# Patient Record
Sex: MALE | Race: WHITE | NOT HISPANIC OR LATINO | Employment: FULL TIME | URBAN - METROPOLITAN AREA
[De-identification: names, ages, dates, MRNs, and addresses within clinical notes are randomized per-mention and may not be internally consistent; named-entity substitution may affect disease eponyms.]

---

## 2020-02-12 ENCOUNTER — HOSPITAL ENCOUNTER (EMERGENCY)
Facility: MEDICAL CENTER | Age: 55
End: 2020-02-12
Attending: EMERGENCY MEDICINE
Payer: COMMERCIAL

## 2020-02-12 VITALS
OXYGEN SATURATION: 96 % | TEMPERATURE: 96.8 F | BODY MASS INDEX: 26.46 KG/M2 | DIASTOLIC BLOOD PRESSURE: 90 MMHG | SYSTOLIC BLOOD PRESSURE: 128 MMHG | RESPIRATION RATE: 16 BRPM | WEIGHT: 174.6 LBS | HEART RATE: 82 BPM | HEIGHT: 68 IN

## 2020-02-12 DIAGNOSIS — K04.7 DENTAL ABSCESS: ICD-10-CM

## 2020-02-12 PROCEDURE — 700102 HCHG RX REV CODE 250 W/ 637 OVERRIDE(OP): Performed by: EMERGENCY MEDICINE

## 2020-02-12 PROCEDURE — A9270 NON-COVERED ITEM OR SERVICE: HCPCS | Performed by: EMERGENCY MEDICINE

## 2020-02-12 PROCEDURE — 96372 THER/PROPH/DIAG INJ SC/IM: CPT

## 2020-02-12 PROCEDURE — 99284 EMERGENCY DEPT VISIT MOD MDM: CPT

## 2020-02-12 PROCEDURE — 700111 HCHG RX REV CODE 636 W/ 250 OVERRIDE (IP): Performed by: EMERGENCY MEDICINE

## 2020-02-12 RX ORDER — AMOXICILLIN AND CLAVULANATE POTASSIUM 875; 125 MG/1; MG/1
1 TABLET, FILM COATED ORAL ONCE
Status: COMPLETED | OUTPATIENT
Start: 2020-02-12 | End: 2020-02-12

## 2020-02-12 RX ORDER — DICLOFENAC POTASSIUM 50 MG/1
1 POWDER, FOR SOLUTION ORAL EVERY 8 HOURS PRN
Qty: 21 EACH | Refills: 0 | Status: SHIPPED | OUTPATIENT
Start: 2020-02-12 | End: 2020-02-19

## 2020-02-12 RX ORDER — KETOROLAC TROMETHAMINE 30 MG/ML
30 INJECTION, SOLUTION INTRAMUSCULAR; INTRAVENOUS ONCE
Status: COMPLETED | OUTPATIENT
Start: 2020-02-12 | End: 2020-02-12

## 2020-02-12 RX ORDER — AMOXICILLIN AND CLAVULANATE POTASSIUM 875; 125 MG/1; MG/1
1 TABLET, FILM COATED ORAL 2 TIMES DAILY
Qty: 14 TAB | Refills: 0 | Status: SHIPPED | OUTPATIENT
Start: 2020-02-12 | End: 2020-02-19

## 2020-02-12 RX ORDER — ACETAMINOPHEN 500 MG
500-1000 TABLET ORAL EVERY 6 HOURS PRN
Qty: 30 TAB | Refills: 0 | Status: SHIPPED | OUTPATIENT
Start: 2020-02-12 | End: 2021-01-29

## 2020-02-12 RX ADMIN — KETOROLAC TROMETHAMINE 30 MG: 30 INJECTION, SOLUTION INTRAMUSCULAR at 18:25

## 2020-02-12 RX ADMIN — AMOXICILLIN AND CLAVULANATE POTASSIUM 1 TABLET: 875; 125 TABLET, FILM COATED ORAL at 18:25

## 2020-02-12 SDOH — HEALTH STABILITY: MENTAL HEALTH: HOW OFTEN DO YOU HAVE A DRINK CONTAINING ALCOHOL?: 2-4 TIMES A MONTH

## 2020-02-13 NOTE — ED TRIAGE NOTES
Virgilio Wu Burton  Chief Complaint   Patient presents with   • Facial Swelling     right cheek area     Pt ambulatory to triage with above complaint.  VSS, no acute distress noted.   Pt states that he believes he has a dental abscess.  Facial swelling noted to right cheek.   Pt returned to [facility], educated on triage process, and to inform staff of any changes or concerns.

## 2020-02-13 NOTE — ED PROVIDER NOTES
"ED Provider Note    Scribed for SUDEEP Mccormick* by Lamine Snell. 2/12/2020, 6:04 PM.    Primary care provider: None noted.  Means of arrival: walk in  History obtained from: patient  History limited by: none    CHIEF COMPLAINT  Chief Complaint   Patient presents with   • Facial Swelling     right cheek area       HPI  Virgilio Manrique is a 54 y.o. male who presents to the Emergency Department for right sided facial swelling onset 2-3 days prior to arrival. He reports associated upper jaw pain and notes 1x episode of blurry vision while trying to focus on computer screen today. He suspects he may have a dental abscess. He denies any fever, headache.    REVIEW OF SYSTEMS  Pertinent positives include right sided facial swelling, upper jaw pain, 1x episode of blurry vision. Pertinent negatives include no fever, headache.      PAST MEDICAL HISTORY   has a past medical history of Dental abscess.    SURGICAL HISTORY  patient denies any surgical history    SOCIAL HISTORY  Social History     Tobacco Use   • Smoking status: Current Every Day Smoker     Types: Cigarettes   • Smokeless tobacco: Never Used   Substance Use Topics   • Alcohol use: Yes     Frequency: 2-4 times a month   • Drug use: Yes     Types: Inhaled     Comment: [facility]      Social History     Substance and Sexual Activity   Drug Use Yes   • Types: Inhaled    Comment: [facility]       FAMILY HISTORY  History reviewed. No pertinent family history.    CURRENT MEDICATIONS  Home Medications     Reviewed by Heather Finn R.N. (Registered Nurse) on 02/12/20 at 0816  Med List Status: Complete   Medication Last Dose Status        Patient Fred Taking any Medications                       ALLERGIES  No Known Allergies    PHYSICAL EXAM  VITAL SIGNS: /89   Pulse 76   Temp 36 °C (96.8 °F) (Temporal)   Resp 17   Ht 1.727 m (5' 8\")   Wt 79.2 kg (174 lb 9.7 oz)   SpO2 98%   BMI 26.55 kg/m²     General: Alert, no acute distress  Skin: " "Warm, dry, normal for ethnicity  Head: Normocephalic, atraumatic. Swelling to left maxilla region  Neck: Trachea midline, no tenderness  Eye: PERRL, normal conjunctiva  ENMT: Oral mucosa moist, no pharyngeal erythema or exudate. Poor dentition. No palpable abscess in gingiva. Uvula midline, speech normal.  Cardiovascular: Regular rate and rhythm, No murmur, Normal peripheral perfusion  Respiratory: Lungs CTA, respirations are non-labored, breath sounds are equal  Musculoskeletal: No deformity  Neurological: Alert and oriented to person, place, time, and situation  Lymphatics: No lymphadenopathy  Psychiatric: Cooperative, appropriate mood & affect        DIAGNOSTIC STUDIES/PROCEDURES    COURSE & MEDICAL DECISION MAKING  Pertinent Labs & Imaging studies reviewed. (See chart for details)    6:04 PM - Patient seen and examined at bedside. Patient will be treated with Augmentin 875-125 mg 1 tablet, Toradol 30 mg. The differential diagnoses include but are not limited to: Dental abscess    Patient Vitals for the past 24 hrs:   BP Temp Temp src Pulse Resp SpO2 Height Weight   20 1833 128/90 -- -- 82 16 96 % -- --   20 1727 -- -- -- -- -- -- -- 79.2 kg (174 lb 9.7 oz)   20 1722 136/89 36 °C (96.8 °F) Temporal 76 17 98 % 1.727 m (5' 8\") --         Decision Makin-year-old male otherwise healthy presents for evaluation of facial pain and swelling.  He has very poor dentition, suspect likely dental abscess.  There is no marketed facial asymmetry, no fluctuance on the exam of the face for the gingiva, there is nothing that I can I&D at this point.  He is nontoxic, obviously uncomfortable but no indication for advanced imaging or oral surgery intervention at this point.  Antibiotics are clearly indicated.  I will also write for NSAID and acetaminophen for symptomatic relief.    The patient will return for new or worsening symptoms and is stable at the time of discharge.    Patient has had high blood " pressure while in the emergency department, felt likely secondary to medical condition. Counseled patient to monitor blood pressure at home and follow up with primary care physician.    DISPOSITION:  Patient will be discharged home in stable condition.    FOLLOW UP:  JOÃO Tim D.O.  [address] [place] [id number]  [phone]    Schedule an appointment as soon as possible for a visit in 3 days      Dentist of your choice    Schedule an appointment as soon as possible for a visit         OUTPATIENT MEDICATIONS:  New Prescriptions    ACETAMINOPHEN (TYLENOL) 500 MG TAB    Take 1-2 Tabs by mouth every 6 hours as needed for Mild Pain.    AMOXICILLIN-CLAVULANATE (AUGMENTIN) 875-125 MG TAB    Take 1 Tab by mouth 2 times a day for 7 days.    DICLOFENAC POTASSIUM 50 MG PACK    Take 1 Tab by mouth every 8 hours as needed (moderate pain) for up to 7 days.           FINAL IMPRESSION  1. Dental abscess          Lamine MUNGUIA (Scribe), am scribing for, and in the presence of, ARMEN Mccormick.    Electronically signed by: Lamine Snell (Scribe), 2/12/2020    Priscilla MUNGUIA M* personally performed the services described in this documentation, as scribed by Lamine Snell in my presence, and it is both accurate and complete. E    The note accurately reflects work and decisions made by me.  Priscilla Weller M.D.  2/12/2020  6:48 PM

## 2021-01-08 ENCOUNTER — TELEPHONE (OUTPATIENT)
Dept: SCHEDULING | Facility: IMAGING CENTER | Age: 56
End: 2021-01-08

## 2021-01-29 ENCOUNTER — OFFICE VISIT (OUTPATIENT)
Dept: MEDICAL GROUP | Facility: PHYSICIAN GROUP | Age: 56
End: 2021-01-29
Payer: COMMERCIAL

## 2021-01-29 ENCOUNTER — TELEPHONE (OUTPATIENT)
Dept: MEDICAL GROUP | Facility: PHYSICIAN GROUP | Age: 56
End: 2021-01-29

## 2021-01-29 ENCOUNTER — APPOINTMENT (OUTPATIENT)
Dept: RADIOLOGY | Facility: IMAGING CENTER | Age: 56
End: 2021-01-29
Attending: NURSE PRACTITIONER
Payer: COMMERCIAL

## 2021-01-29 VITALS
SYSTOLIC BLOOD PRESSURE: 136 MMHG | DIASTOLIC BLOOD PRESSURE: 70 MMHG | HEIGHT: 68 IN | WEIGHT: 189 LBS | HEART RATE: 74 BPM | BODY MASS INDEX: 28.64 KG/M2 | OXYGEN SATURATION: 94 % | RESPIRATION RATE: 16 BRPM | TEMPERATURE: 97.8 F

## 2021-01-29 DIAGNOSIS — G47.09 OTHER INSOMNIA: ICD-10-CM

## 2021-01-29 DIAGNOSIS — Z12.11 COLON CANCER SCREENING: ICD-10-CM

## 2021-01-29 DIAGNOSIS — R22.2 SUBCUTANEOUS NODULE OF ABDOMINAL WALL: ICD-10-CM

## 2021-01-29 DIAGNOSIS — Z78.9 ALCOHOL USE: ICD-10-CM

## 2021-01-29 DIAGNOSIS — Z72.0 TOBACCO USE: ICD-10-CM

## 2021-01-29 DIAGNOSIS — H61.21 IMPACTED CERUMEN OF RIGHT EAR: ICD-10-CM

## 2021-01-29 DIAGNOSIS — R07.81 RIB PAIN ON RIGHT SIDE: ICD-10-CM

## 2021-01-29 DIAGNOSIS — R07.81 RIB PAIN ON RIGHT SIDE: Primary | ICD-10-CM

## 2021-01-29 DIAGNOSIS — Z11.59 NEED FOR HEPATITIS C SCREENING TEST: ICD-10-CM

## 2021-01-29 DIAGNOSIS — R63.5 WEIGHT GAIN: ICD-10-CM

## 2021-01-29 DIAGNOSIS — Z00.00 HEALTHCARE MAINTENANCE: ICD-10-CM

## 2021-01-29 PROCEDURE — 99204 OFFICE O/P NEW MOD 45 MIN: CPT | Performed by: NURSE PRACTITIONER

## 2021-01-29 PROCEDURE — 71046 X-RAY EXAM CHEST 2 VIEWS: CPT | Mod: TC | Performed by: NURSE PRACTITIONER

## 2021-01-29 SDOH — HEALTH STABILITY: MENTAL HEALTH: HOW OFTEN DO YOU HAVE A DRINK CONTAINING ALCOHOL?: 2-3 TIMES A WEEK

## 2021-01-29 SDOH — HEALTH STABILITY: MENTAL HEALTH: HOW MANY STANDARD DRINKS CONTAINING ALCOHOL DO YOU HAVE ON A TYPICAL DAY?: 1 OR 2

## 2021-01-29 ASSESSMENT — PATIENT HEALTH QUESTIONNAIRE - PHQ9: CLINICAL INTERPRETATION OF PHQ2 SCORE: 0

## 2021-01-29 NOTE — ASSESSMENT & PLAN NOTE
This is a chronic condition. He is smoking.  Years used:  30  Packs/day:  1.0 PPD  Previously quit:  1996 - for about  6 months  Method used:  Nicotine patch

## 2021-01-29 NOTE — TELEPHONE ENCOUNTER
----- Message from YEIMI Mejia sent at 1/29/2021  1:02 PM PST -----  Hi,     Please Greg and let him know that his chest x-ray came back normal.  There is no cardiac or respiratory disease seen.  There are no bony abnormalities seen.      Thank you!  Apolonia

## 2021-01-29 NOTE — PROGRESS NOTES
Subjective  Chief Complaint  Establish to manage his chronic conditions    History of Present Illness  Virgilio Manrique is a 55 y.o. male. This patient is here today to establish care.  He has not seen a primary care provider for years.    Tobacco use  This is a chronic condition. He is smoking.  Years used:  30  Packs/day:  1.0 PPD  Previously quit:  1996 - for about  6 months  Method used:  Nicotine patch    Rib pain on right side  Acute pain   This is a new problem.   Patient is complaining of pain x 2 month(s) located in his right rib pain.  Pain is intermittent, described as sharp and a 5/10 on the pain scale.   Treatments tried include: nothing  He reports the pain is below right breast, around his anterior rib cage.  He reports when he tries to turn over to his left hand side it causes pain. He denies falling or trauma to the area.     Weight gain  He reports that he has gained 20 pounds over the last year.    Alcohol use  This is a chronic condition.  Max EtOH use:  2 - 1.5oz liquor bottles  Current EtOH use:  Yes  Drink(s) of choice:  Hard liquor  Symptoms of withdrawal: none  CAGE:        Have you ever felt you should Cut down on your drinking?  No      Have people Annoyed you by criticizing your drinking?  No      Have you ever felt bad or Guilty about your drinking?  No      Have you ever taken a drink first thing in the morning (Eye-opener) to steady your nerves or get rid of a hangover?  No    Other insomnia  This is a chronic condition.  He reports he is waking up every 2 hours throughout the night.  He does report he takes Tylenol PM and/or Advil PM nightly to help him sleep.  He has tried melatonin in the past with some success.  He most recently is using melatonin time release.    Past Medical History    Allergies: Patient has no known allergies.  Past Medical History:   Diagnosis Date   • Dental abscess      History reviewed. No pertinent surgical history.  No current Epic-ordered outpatient  "medications on file.     No current [facility]-ordered facility-administered medications on file.      Family History:    Family History   Problem Relation Age of Onset   • Cancer Mother         lung   • Cancer Father         lung   • Hyperlipidemia Brother    • Heart Disease Paternal Aunt    • Diabetes Neg Hx    • Hypertension Neg Hx    • Stroke Neg Hx       Personal/Social History:    Social History     Tobacco Use   • Smoking status: Current Every Day Smoker     Packs/day: 1.00     Years: 35.00     Pack years: 35.00     Types: Cigarettes   • Smokeless tobacco: Never Used   Substance Use Topics   • Alcohol use: Yes     Alcohol/week: 0.6 oz     Types: 1 Standard drinks or equivalent per week     Frequency: 2-3 times a week     Drinks per session: 1 or 2   • Drug use: Yes     Frequency: 3.0 times per week     Types: Inhaled     Comment: occasionally, ryne     Social History     Social History Narrative   • Not on file      Review of Systems:     General: Negative for fever/chills.    Respiratory:  Positive for cough related to smoking.  Negative for dyspnea.     Cardiovascular:  Negative for chest pain.   Gastrointestinal:  Negative for nausea/vomiting.    Musculoskeletal:  Positive for right rib pain.     Objective  Physical Exam:   /70 (BP Location: Right arm, Patient Position: Sitting, BP Cuff Size: Adult)   Pulse 74   Temp 36.6 °C (97.8 °F) (Temporal)   Resp 16   Ht 1.727 m (5' 8\")   Wt 85.7 kg (189 lb)   SpO2 94%  Body mass index is 28.74 kg/m².  General:  Alert and oriented.  Well appearing.  NAD.  Head:  Normocephalic.   Eyes:  Eyes conjunctiva clear lids without ptosis, pupils equal and reactive to light accommodation.    ENT: Ears normal shape and contour.  Left canal is clear, tympanic membrane is benign.  Right canal is obstructed with cerumen - unable to visualize tympanic membrane.  Oropharynx is without erythema, edema or exudates.   Neck: Supple without JVD. No lymphadenopathy.  Pulmonary: "  Normal effort.  Diminished to ausculation without rales, ronchi, or wheezing.  Cardiovascular:  Regular rate and rhythm without murmur, rubs or gallop.  Radial pulses are intact and equal bilaterally.  Gastrointestinal: Abdomen soft, nontender, nondistended. Normal bowel sounds. Liver and spleen are not palpable.  Bilateral soft tissue masses, nontender palpated to bilateral lower rib cage.  Hard, nontender nodule palpated under sternum bone.  Musculoskeletal:  No extremity cyanosis, clubbing, or edema.    Skin:  Warm and dry.  No obvious lesions.  Lymph: No cervical or supraclavicular lymph nodes are palpable.  Neurologic: Grossly intact.  DTRs 2+/3 bilaterally.  Sensation intact.   Psych: Normal mood and affect. Alert and oriented x3. Judgment and insight is normal.      Assessment/Plan   1. Rib pain on right side  This is a new condition.  He reports that over the last two months he has had right rib pain that occurs when he sneezes or coughs.  He denies any trauma to the area.  He reports that the pain is sharp when it does occur.  At this time we will obtain a chest x-ray to visualize the area.  We will also obtain labs for further interventions.  He does have a significant history for tobacco use for more than 20 years.  - DX-CHEST-2 VIEWS; Future  - CBC WITH DIFFERENTIAL; Future  - Comp Metabolic Panel; Future    2. Tobacco use  This is a chronic condition, not controlled.  He does smoke 1 pack a day.  He has been smoking since he was 20 years old.  He is aware that he needs to stop smoking.  He has tried to stop smoking in the past.  We did discuss options to help him quit smoking, including nicotine and other medication management assistance.  He is open to starting these and we will discuss this further at next visit per patient request.  - CBC WITH DIFFERENTIAL; Future  - Comp Metabolic Panel; Future  - Lipid Profile; Future  - TSH WITH REFLEX TO FT4; Future  - VITAMIN D,25 HYDROXY; Future  - VITAMIN  B12; Future    3. Other insomnia  This is a chronic condition, not controlled.  He takes Tylenol PM or Advil PM nightly to help with his sleep.  We did review potential for bleeding as well that is liver toxicity with use of both medications chronically.  He has tried melatonin in the past but intermittently.  I did discuss with him that he can take melatonin nightly for the next month, and this will help to reset his circadian clock.  He is open to doing this, and he will try a higher dose than the previous doses he has taken in the past.  We will follow this up at next visit.  - TSH WITH REFLEX TO FT4; Future  - VITAMIN D,25 HYDROXY; Future    4. Weight gain  This is a chronic condition, not controlled.  He reports he has gained 20 pounds over the last year.  He reports that his family believes is due to his alcohol consumption.  We will obtain some labs for further evaluation and to determine further interventions.  - CBC WITH DIFFERENTIAL; Future  - Comp Metabolic Panel; Future  - Lipid Profile; Future  - TSH WITH REFLEX TO FT4; Future    5. Alcohol use  This is a chronic condition, not controlled.  He does drink 2-1.5 ounce liquor bottles 2-3 times a week.  Cage score = 0.  We did discuss the importance of decreasing his alcohol use, as this may be contributing to his weight gain.  - CBC WITH DIFFERENTIAL; Future  - Comp Metabolic Panel; Future  - Lipid Profile; Future  - TSH WITH REFLEX TO FT4; Future  - VITAMIN D,25 HYDROXY; Future  - VITAMIN B12; Future    6. Subcutaneous nodule of abdominal wall  This is a new condition.  He reports there is a nodule to his mid abdomen, under his sternum bone.  He also reports that there were 2 nodules located bilaterally to his rib cage area.  Upon assessment, there is a hard, nontender nodule to his mid abdomen, and under his sternum bone as well as to soft, nontender nodules on his lateral rib cages.  At this point will obtain ultrasound to evaluate these nodules.  -  US-ABDOMEN COMPLETE SURVEY; Future  - CBC WITH DIFFERENTIAL; Future  - Comp Metabolic Panel; Future    7. Healthcare maintenance  He has not had a primary care provider for quite some time.  We discussed that we will complete health care maintenance and order preventative screenings.    - CBC WITH DIFFERENTIAL; Future  - Comp Metabolic Panel; Future  - Lipid Profile; Future  - TSH WITH REFLEX TO FT4; Future  - VITAMIN D,25 HYDROXY; Future  - VITAMIN B12; Future    8. Colon cancer screening  - REFERRAL TO GI FOR COLONOSCOPY    9. Need for hepatitis C screening test  - HCV Scrn ( [id number] [employer]); Future    10. Impacted cerumen of right ear  Right ear canal impacted with cerumen prior to ear irrigation.  After ear irrigation, right ear canal is clear and tympanic membrane has   - Ear Irrigation (MA Only); Future    Health Maintenance: Completed  - He is refusing vaccinations at this time despite education regarding importance of vaccines.     Return in about 4 weeks (around 2021), or if symptoms worsen or fail to improve, for follow up labs, imaging.    I have placed the above orders and discussed them with an approved delegating provider.  The MA is performing the below orders under the direction of Dr. Bran DO.     I spent a total of 50 minutes with record review, exam, and communication with the patient, communication with other providers, and documentation of this encounter.    Please note that this dictation was created using voice recognition software. I have made every reasonable attempt to correct obvious errors, but I expect that there are errors of grammar and possibly content that I did not discover before finalizing the note.    MARIANNA Mejia  [facility]

## 2021-01-29 NOTE — ASSESSMENT & PLAN NOTE
This is a chronic condition.  Max EtOH use:  2 - 1.5oz liquor bottles  Current EtOH use:  Yes  Drink(s) of choice:  Hard liquor  Symptoms of withdrawal: none  CAGE:        Have you ever felt you should Cut down on your drinking?  No      Have people Annoyed you by criticizing your drinking?  No      Have you ever felt bad or Guilty about your drinking?  No      Have you ever taken a drink first thing in the morning (Eye-opener) to steady your nerves or get rid of a hangover?  No

## 2021-01-29 NOTE — ASSESSMENT & PLAN NOTE
Acute pain   This is a new problem.   Patient is complaining of pain x 2 month(s) located in his right rib pain.  Pain is intermittent, described as sharp and a 5/10 on the pain scale.   Treatments tried include: nothing  He reports the pain is below right breast, around his anterior rib cage.  He reports when he tries to turn over to his left hand side it causes pain. He denies falling or trauma to the area.

## 2021-01-29 NOTE — ASSESSMENT & PLAN NOTE
This is a chronic condition.  He reports he is waking up every 2 hours throughout the night.  He does report he takes Tylenol PM and/or Advil PM nightly to help him sleep.  He has tried melatonin in the past with some success.  He most recently is using melatonin time release.

## 2021-02-17 ENCOUNTER — HOSPITAL ENCOUNTER (OUTPATIENT)
Dept: LAB | Facility: MEDICAL CENTER | Age: 56
End: 2021-02-17
Attending: NURSE PRACTITIONER
Payer: COMMERCIAL

## 2021-02-17 ENCOUNTER — TELEPHONE (OUTPATIENT)
Dept: MEDICAL GROUP | Facility: PHYSICIAN GROUP | Age: 56
End: 2021-02-17

## 2021-02-17 ENCOUNTER — HOSPITAL ENCOUNTER (OUTPATIENT)
Dept: RADIOLOGY | Facility: MEDICAL CENTER | Age: 56
End: 2021-02-17
Attending: NURSE PRACTITIONER
Payer: COMMERCIAL

## 2021-02-17 DIAGNOSIS — R22.2 SUBCUTANEOUS NODULE OF ABDOMINAL WALL: ICD-10-CM

## 2021-02-17 DIAGNOSIS — Z11.59 NEED FOR HEPATITIS C SCREENING TEST: ICD-10-CM

## 2021-02-17 DIAGNOSIS — G47.09 OTHER INSOMNIA: ICD-10-CM

## 2021-02-17 DIAGNOSIS — R63.5 WEIGHT GAIN: ICD-10-CM

## 2021-02-17 DIAGNOSIS — Z72.0 TOBACCO USE: ICD-10-CM

## 2021-02-17 DIAGNOSIS — Z00.00 HEALTHCARE MAINTENANCE: ICD-10-CM

## 2021-02-17 DIAGNOSIS — Z78.9 ALCOHOL USE: ICD-10-CM

## 2021-02-17 DIAGNOSIS — R07.81 RIB PAIN ON RIGHT SIDE: ICD-10-CM

## 2021-02-17 LAB
25(OH)D3 SERPL-MCNC: 40 NG/ML (ref 30–100)
ALBUMIN SERPL BCP-MCNC: 4.3 G/DL (ref 3.2–4.9)
ALBUMIN/GLOB SERPL: 1.6 G/DL
ALP SERPL-CCNC: 64 U/L (ref 30–99)
ALT SERPL-CCNC: 12 U/L (ref 2–50)
ANION GAP SERPL CALC-SCNC: 10 MMOL/L (ref 7–16)
AST SERPL-CCNC: 14 U/L (ref 12–45)
BASOPHILS # BLD AUTO: 1 % (ref 0–1.8)
BASOPHILS # BLD: 0.05 K/UL (ref 0–0.12)
BILIRUB SERPL-MCNC: 0.5 MG/DL (ref 0.1–1.5)
BUN SERPL-MCNC: 17 MG/DL (ref 8–22)
CALCIUM SERPL-MCNC: 9.3 MG/DL (ref 8.5–10.5)
CHLORIDE SERPL-SCNC: 108 MMOL/L (ref 96–112)
CHOLEST SERPL-MCNC: 235 MG/DL (ref 100–199)
CO2 SERPL-SCNC: 25 MMOL/L (ref 20–33)
CREAT SERPL-MCNC: 0.99 MG/DL (ref 0.5–1.4)
EOSINOPHIL # BLD AUTO: 0.09 K/UL (ref 0–0.51)
EOSINOPHIL NFR BLD: 1.9 % (ref 0–6.9)
ERYTHROCYTE [DISTWIDTH] IN BLOOD BY AUTOMATED COUNT: 41.2 FL (ref 35.9–50)
GLOBULIN SER CALC-MCNC: 2.7 G/DL (ref 1.9–3.5)
GLUCOSE SERPL-MCNC: 98 MG/DL (ref 65–99)
HCT VFR BLD AUTO: 52.7 % (ref 42–52)
HCV AB SER QL: NORMAL
HDLC SERPL-MCNC: 59 MG/DL
HGB BLD-MCNC: 17 G/DL (ref 14–18)
IMM GRANULOCYTES # BLD AUTO: 0.04 K/UL (ref 0–0.11)
IMM GRANULOCYTES NFR BLD AUTO: 0.8 % (ref 0–0.9)
LDLC SERPL CALC-MCNC: 153 MG/DL
LYMPHOCYTES # BLD AUTO: 1.38 K/UL (ref 1–4.8)
LYMPHOCYTES NFR BLD: 28.4 % (ref 22–41)
MCH RBC QN AUTO: 26.1 PG (ref 27–33)
MCHC RBC AUTO-ENTMCNC: 32.3 G/DL (ref 33.7–35.3)
MCV RBC AUTO: 81 FL (ref 81.4–97.8)
MONOCYTES # BLD AUTO: 0.38 K/UL (ref 0–0.85)
MONOCYTES NFR BLD AUTO: 7.8 % (ref 0–13.4)
NEUTROPHILS # BLD AUTO: 2.92 K/UL (ref 1.82–7.42)
NEUTROPHILS NFR BLD: 60.1 % (ref 44–72)
NRBC # BLD AUTO: 0 K/UL
NRBC BLD-RTO: 0 /100 WBC
PLATELET # BLD AUTO: 223 K/UL (ref 164–446)
PMV BLD AUTO: 9.9 FL (ref 9–12.9)
POTASSIUM SERPL-SCNC: 4.1 MMOL/L (ref 3.6–5.5)
PROT SERPL-MCNC: 7 G/DL (ref 6–8.2)
RBC # BLD AUTO: 6.51 M/UL (ref 4.7–6.1)
SODIUM SERPL-SCNC: 143 MMOL/L (ref 135–145)
TRIGL SERPL-MCNC: 116 MG/DL (ref 0–149)
TSH SERPL DL<=0.005 MIU/L-ACNC: 1.58 UIU/ML (ref 0.38–5.33)
VIT B12 SERPL-MCNC: 541 PG/ML (ref 211–911)
WBC # BLD AUTO: 4.9 K/UL (ref 4.8–10.8)

## 2021-02-17 PROCEDURE — 80061 LIPID PANEL: CPT

## 2021-02-17 PROCEDURE — 80053 COMPREHEN METABOLIC PANEL: CPT

## 2021-02-17 PROCEDURE — 84443 ASSAY THYROID STIM HORMONE: CPT

## 2021-02-17 PROCEDURE — 36415 COLL VENOUS BLD VENIPUNCTURE: CPT

## 2021-02-17 PROCEDURE — G0472 HEP C SCREEN HIGH RISK/OTHER: HCPCS

## 2021-02-17 PROCEDURE — 82306 VITAMIN D 25 HYDROXY: CPT

## 2021-02-17 PROCEDURE — 85025 COMPLETE CBC W/AUTO DIFF WBC: CPT

## 2021-02-17 PROCEDURE — 76705 ECHO EXAM OF ABDOMEN: CPT

## 2021-02-17 PROCEDURE — 82607 VITAMIN B-12: CPT

## 2021-02-17 NOTE — TELEPHONE ENCOUNTER
----- Message from YEIMI Mejia sent at 2/17/2021 11:23 AM PST -----  Hi,     Please call Greg to let him know that the bumps on his abdomen appear to be lipomas, which are consisted of fat.  Lipomas are benign.  These are usually surgically removed.  We can discuss this further at his next visit.    Thank you!  Apolonia

## 2021-02-18 ENCOUNTER — TELEPHONE (OUTPATIENT)
Dept: MEDICAL GROUP | Facility: PHYSICIAN GROUP | Age: 56
End: 2021-02-18

## 2021-02-18 NOTE — TELEPHONE ENCOUNTER
----- Message from YEIMI Mejia sent at 2/17/2021  3:46 PM PST -----  Hi,     Please call Greg and let him know that all his labs looked great except:   - Total cholesterol and LDL (bad cholesterol) are elevated.  These numbers can change with diet and exercise.    - Red blood count and hematocrit count are elevated.  This can be due to dehydration as well as tobacco use.  Otherwise, he is not anemic.     We can review his labs further at his next visit.     Thank you!Apolonia   Let her know that she can discontinue the Cipro, and we will look at the CT of the abdomen to see exactly what is causing the abdominal pain.

## 2021-02-19 NOTE — TELEPHONE ENCOUNTER
Phone Number Called: [phone] (home)       Call outcome: Spoke to patient regarding message below.    Message: pt advised.

## 2021-03-16 ENCOUNTER — OFFICE VISIT (OUTPATIENT)
Dept: URGENT CARE | Facility: CLINIC | Age: 56
End: 2021-03-16
Payer: COMMERCIAL

## 2021-03-16 VITALS
WEIGHT: 184.8 LBS | RESPIRATION RATE: 14 BRPM | SYSTOLIC BLOOD PRESSURE: 126 MMHG | HEART RATE: 87 BPM | BODY MASS INDEX: 28.01 KG/M2 | TEMPERATURE: 97.2 F | HEIGHT: 68 IN | OXYGEN SATURATION: 95 % | DIASTOLIC BLOOD PRESSURE: 84 MMHG

## 2021-03-16 DIAGNOSIS — H60.92 OTITIS EXTERNA OF LEFT EAR, UNSPECIFIED CHRONICITY, UNSPECIFIED TYPE: ICD-10-CM

## 2021-03-16 PROCEDURE — 99213 OFFICE O/P EST LOW 20 MIN: CPT | Performed by: FAMILY MEDICINE

## 2021-03-16 RX ORDER — COLISTIN SULFATE, NEOMYCIN SULFATE, THONZONIUM BROMIDE AND HYDROCORTISONE ACETATE 3; 3.3; .5; 1 MG/ML; MG/ML; MG/ML; MG/ML
5 SUSPENSION AURICULAR (OTIC) 3 TIMES DAILY
Qty: 10 ML | Refills: 0 | Status: SHIPPED | OUTPATIENT
Start: 2021-03-16 | End: 2022-10-12

## 2021-03-16 ASSESSMENT — FIBROSIS 4 INDEX: FIB4 SCORE: 1

## 2021-03-16 NOTE — LETTER
March 16, 2021         Patient: Virgilio Manrique   YOB: 1965   Date of Visit: 3/16/2021           To Whom it May Concern:    Virgilio Manrique was seen in my clinic on 3/16/2021.  .  If you have any questions or concerns, please don't hesitate to call.        Sincerely,           Elise Saenz M.D.  Electronically Signed

## 2021-03-16 NOTE — PROGRESS NOTES
"Subjective:      Greg Manrique is a 55 y.o. male who presents with Otalgia (x 4 days on L ear.  Denies fever or chills. )            This is a new problem.  55-year-old otherwise healthy presenting for evaluation of left ear pain.  Denies any travel, flying, scuba diving, recent cold symptoms or injury.  Denies any trouble hearing, ringing sensation or balance issues.  No cold symptoms or allergy symptoms reported.  Pertinent review of system otherwise negative.      ROS       Objective:     /84   Pulse 87   Temp 36.2 °C (97.2 °F) (Temporal)   Resp 14   Ht 1.727 m (5' 8\")   Wt 83.8 kg (184 lb 12.8 oz)   SpO2 95%   BMI 28.10 kg/m²      Physical Exam  Constitutional:       General: He is not in acute distress.     Appearance: He is not ill-appearing, toxic-appearing or diaphoretic.   HENT:      Head: Normocephalic and atraumatic.      Comments: Scar tissue noted in the right tympanic membrane otherwise no signs of infection.     Right Ear: Ear canal and external ear normal. Tympanic membrane is scarred. Tympanic membrane is not perforated, erythematous, retracted or bulging.      Left Ear: Tympanic membrane and external ear normal. Tenderness (On palpation of the tragus) present. No drainage or swelling.  No middle ear effusion. There is no impacted cerumen. No foreign body. No mastoid tenderness. No hemotympanum. Tympanic membrane is not injected, scarred, perforated, erythematous, retracted or bulging. Tympanic membrane has normal mobility.   Eyes:      Conjunctiva/sclera: Conjunctivae normal.   Cardiovascular:      Rate and Rhythm: Normal rate.   Pulmonary:      Effort: Pulmonary effort is normal. No respiratory distress.      Breath sounds: No stridor.   Skin:     Coloration: Skin is not jaundiced or pale.   Neurological:      Mental Status: He is alert and oriented to person, place, and time.   Psychiatric:         Thought Content: Thought content normal.                 Assessment/Plan:      "   1. Otitis externa of left ear, unspecified chronicity, unspecified type  - neomycin/colistin/thonz/HC (CORTISPORIN-TC) 3.3-3-10-0.5 MG/ML Suspension; Administer 5 Drops into affected ear(s) 3 times a day.  Dispense: 10 mL; Refill: 0      Mild inflammation in the left ear canal which could be an early sign of otitis externa based on the clinical exam.  Recommend topical antibiotic as per orders.  Over-the-counter medication as needed.ppo  Warning signs reviewed  Follow up if not significantly improved as expected in 3-4 days, sooner if any worsening or new symptoms    .

## 2021-03-16 NOTE — PATIENT INSTRUCTIONS
Follow up if not significantly improved as expected in 3-4  days, sooner if any worsening or new symptoms      Otitis Externa    Otitis externa is an infection of the outer ear canal. The outer ear canal is the area between the outside of the ear and the eardrum. Otitis externa is sometimes called swimmer's ear.  What are the causes?  Common causes of this condition include:  · Swimming in dirty water.  · Moisture in the ear.  · An injury to the inside of the ear.  · An object stuck in the ear.  · A cut or scrape on the outside of the ear.  What increases the risk?  You are more likely to develop this condition if you go swimming often.  What are the signs or symptoms?  The first symptom of this condition is often itching in the ear. Later symptoms of the condition include:  · Swelling of the ear.  · Redness in the ear.  · Ear pain. The pain may get worse when you pull on your ear.  · Pus coming from the ear.  How is this diagnosed?  This condition may be diagnosed by examining the ear and testing fluid from the ear for bacteria and funguses.  How is this treated?  This condition may be treated with:  · Antibiotic ear drops. These are often given for 10-14 days.  · Medicines to reduce itching and swelling.  Follow these instructions at home:  · If you were prescribed antibiotic ear drops, use them as told by your health care provider. Do not stop using the antibiotic even if your condition improves.  · Take over-the-counter and prescription medicines only as told by your health care provider.  · Avoid getting water in your ears as told by your health care provider. This may include avoiding swimming or water sports for a few days.  · Keep all follow-up visits as told by your health care provider. This is important.  How is this prevented?  · Keep your ears dry. Use the corner of a towel to dry your ears after you swim or bathe.  · Avoid scratching or putting things in your ear. Doing these things can damage the ear  canal or remove the protective wax that lines it, which makes it easier for bacteria and funguses to grow.  · Avoid swimming in lakes, polluted water, or pools that may not have enough chlorine.  Contact a health care provider if:  · You have a fever.  · Your ear is still red, swollen, painful, or draining pus after 3 days.  · Your redness, swelling, or pain gets worse.  · You have a severe headache.  · You have redness, swelling, pain, or tenderness in the area behind your ear.  Summary  · Otitis externa is an infection of the outer ear canal.  · Common causes include swimming in dirty water, moisture in the ear, or a cut or scrape in the ear.  · Symptoms include pain, redness, and swelling of the ear.  · If you were prescribed antibiotic ear drops, use them as told by your health care provider. Do not stop using the antibiotic even if your condition improves.  This information is not intended to replace advice given to you by your health care provider. Make sure you discuss any questions you have with your health care provider.  Document Released: 12/18/2006 Document Revised: 05/24/2019 Document Reviewed: 05/24/2019  Elsevier Patient Education © 2020 [employer]

## 2022-10-12 ENCOUNTER — OFFICE VISIT (OUTPATIENT)
Dept: MEDICAL GROUP | Facility: MEDICAL CENTER | Age: 57
End: 2022-10-12
Payer: COMMERCIAL

## 2022-10-12 ENCOUNTER — TELEPHONE (OUTPATIENT)
Dept: HEMATOLOGY ONCOLOGY | Facility: MEDICAL CENTER | Age: 57
End: 2022-10-12

## 2022-10-12 VITALS
BODY MASS INDEX: 27.86 KG/M2 | SYSTOLIC BLOOD PRESSURE: 114 MMHG | WEIGHT: 183.8 LBS | DIASTOLIC BLOOD PRESSURE: 80 MMHG | HEART RATE: 69 BPM | HEIGHT: 68 IN | OXYGEN SATURATION: 96 % | TEMPERATURE: 98.5 F

## 2022-10-12 DIAGNOSIS — Z12.11 COLON CANCER SCREENING: ICD-10-CM

## 2022-10-12 DIAGNOSIS — D17.1 LIPOMA OF TORSO: ICD-10-CM

## 2022-10-12 DIAGNOSIS — F17.210 CIGARETTE SMOKER: ICD-10-CM

## 2022-10-12 DIAGNOSIS — G47.09 OTHER INSOMNIA: ICD-10-CM

## 2022-10-12 PROBLEM — L98.9 SKIN LESION: Status: ACTIVE | Noted: 2022-10-12

## 2022-10-12 PROCEDURE — 99214 OFFICE O/P EST MOD 30 MIN: CPT | Performed by: FAMILY MEDICINE

## 2022-10-12 RX ORDER — NICOTINE 21 MG/24HR
1 PATCH, TRANSDERMAL 24 HOURS TRANSDERMAL EVERY 24 HOURS
Qty: 28 PATCH | Refills: 0 | Status: SHIPPED | OUTPATIENT
Start: 2022-11-09 | End: 2022-11-14

## 2022-10-12 RX ORDER — NICOTINE 21 MG/24HR
1 PATCH, TRANSDERMAL 24 HOURS TRANSDERMAL EVERY 24 HOURS
Qty: 28 PATCH | Refills: 0 | Status: SHIPPED | OUTPATIENT
Start: 2022-10-12 | End: 2022-11-09

## 2022-10-12 ASSESSMENT — PATIENT HEALTH QUESTIONNAIRE - PHQ9: CLINICAL INTERPRETATION OF PHQ2 SCORE: 0

## 2022-10-12 ASSESSMENT — FIBROSIS 4 INDEX: FIB4 SCORE: 1.01

## 2022-10-12 NOTE — PROGRESS NOTES
Subjective:     CC:  Diagnoses of Lipoma of torso, Other insomnia, Cigarette smoker, and Colon cancer screening were pertinent to this visit.    HISTORY OF THE PRESENT ILLNESS: Patient is a 56 y.o. male. This pleasant patient is here today to establish care and discuss lumps.     Patient has noted some new small lumps on his torso  He has had this before, thought he was told they are calcium deposits  They do not hurt, but do get red and irritated when he messes with them    Patient tells me he only sleeps a few hours at a time and wakes up intermittently through the night  He and his SO sleep in separate beds but denies it is due to flailing or snoring  He tells me many of his family members have sleep apnea  He is not keen on wearing any kind of mask but seemed more amenable to a nose pillow device as a possibility    Patient has a 35 year smoking history  He sometimes gets short of breath with activity  He finds he gets a cough if he does not smoke  He does understand how bad it is for him and would like to quit  Patient has quit before briefly with Wellbutrin but does not want to take medication  He has not concerning weight loss, new cough or coughing up blood    Patient has not had colon cancer screening and does think it would be a good idea    Problem   Lipoma of Torso   Cigarette Smoker   Other Insomnia       Current Outpatient Medications Ordered in Epic   Medication Sig Dispense Refill    nicotine (NICODERM) 21 MG/24HR PATCH 24 HR Place 1 Patch on the skin every 24 hours for 28 days. 28 Patch 0    [START ON 11/9/2022] nicotine (NICODERM) 14 MG/24HR PATCH 24 HR Place 1 Patch on the skin every 24 hours. 28 Patch 0    [START ON 12/7/2022] nicotine (NICODERM) 7 MG/24HR PATCH 24 HR Place 1 Patch on the skin every 24 hours for 28 days. 28 Patch 0     No current Epic-ordered facility-administered medications on file.       Health Maintenance: declines vaccines but order for colonoscopy and lung cancer screening  "placed    ROS:   ROS see HPI      Objective:       Exam: /80   Pulse 69   Temp 36.9 °C (98.5 °F) (Temporal)   Ht 1.727 m (5' 8\")   Wt 83.4 kg (183 lb 12.8 oz)   SpO2 96%  Body mass index is 27.95 kg/m².    Physical Exam  Vitals reviewed.   Constitutional:       General: He is not in acute distress.     Appearance: Normal appearance.   HENT:      Head: Normocephalic and atraumatic.   Cardiovascular:      Rate and Rhythm: Normal rate and regular rhythm.      Heart sounds: Normal heart sounds.   Pulmonary:      Effort: Pulmonary effort is normal.      Breath sounds: Normal breath sounds.   Skin:     General: Skin is warm and dry.      Findings: Lesion (small, soft, mobile lesion feels cyst like or possible small lipoma) present.   Neurological:      Mental Status: He is alert. Mental status is at baseline.   Psychiatric:         Mood and Affect: Mood normal.         Behavior: Behavior normal.     Imaging:  US soft tissue 2/17/2021  IMPRESSION:  1.  Ovoid lesions within the simultaneous fat of the lower abdominal bilaterally consistent with lipomas, larger on the RIGHT.  2.  No epigastric abdominal wall mass demonstrated.    STOPBANG - Sleep Apnea Screening      Flowsheet Row Most Recent Value   S - Have you been told that you SNORE? No   T - Are you often TIRED during the day? Yes   O - Do you know if you stop breathing or has anyone witnessed you stop breathing while you were asleep? (OBSTRUCTION) No   P - Do you have high blood PRESSURE or on medication to control high blood pressure? No   B - Is your Body Mass Index greater than 35? (BMI) No   A - Are you 50 years old or older? (AGE) Yes   N - Are you a male with a NECK circumference greater than 17 inches, or a female with a neck circumference greater than 16 inches? No   G - Are you male? (GENDER) Yes   STOPBANG Total Score 3   VALERY Risk Intermediate Risk             Assessment & Plan:   56 y.o. male with the following -    Problem List Items Addressed " This Visit       Cigarette smoker     Significant smoking history but motivated to quit  Will refer to lung cancer screening program  Will send in step therapy for nicotine patches         Relevant Medications    nicotine (NICODERM) 21 MG/24HR PATCH 24 HR    nicotine (NICODERM) 14 MG/24HR PATCH 24 HR (Start on 11/9/2022)    nicotine (NICODERM) 7 MG/24HR PATCH 24 HR (Start on 12/7/2022)    Other Relevant Orders    REFERRAL TO LUNG CANCER SCREENING PROGRAM    Other insomnia     After much discussion will refer to sleep medicine  Do think it would be beneficial to patient  If requiring CPAP therapy hopefully can find a mask that is amenable to him         Relevant Orders    Referral to Pulmonary and Sleep Medicine    Lipoma of torso     Most likely new lipomas on torso  Provided for US follow up to better characterize discussed can see dermatology for removal if desired but generally benign growths         Relevant Orders    US-ABDOMEN LTD (SOFT TISSUE)     Other Visit Diagnoses       Colon cancer screening        Relevant Orders    Referral to GI for Colonoscopy              Return in about 5 months (around 3/12/2023) for smoking, Imaging F/U, Lab F/U.    Please note that this dictation was created using voice recognition software. I have made every reasonable attempt to correct obvious errors, but I expect that there are errors of grammar and possibly content that I did not discover before finalizing the note.

## 2022-10-12 NOTE — TELEPHONE ENCOUNTER
Due to the patient's insurance, the referral will be deferred until after negotiations are finalized 10/31.

## 2022-10-13 NOTE — ASSESSMENT & PLAN NOTE
Significant smoking history but motivated to quit  Will refer to lung cancer screening program  Will send in step therapy for nicotine patches

## 2022-10-13 NOTE — ASSESSMENT & PLAN NOTE
After much discussion will refer to sleep medicine  Do think it would be beneficial to patient  If requiring CPAP therapy hopefully can find a mask that is amenable to him

## 2022-10-13 NOTE — ASSESSMENT & PLAN NOTE
Most likely new lipomas on torso  Provided for US follow up to better characterize discussed can see dermatology for removal if desired but generally benign growths

## 2022-11-01 ENCOUNTER — TELEPHONE (OUTPATIENT)
Dept: HEMATOLOGY ONCOLOGY | Facility: MEDICAL CENTER | Age: 57
End: 2022-11-01
Payer: COMMERCIAL

## 2022-11-01 NOTE — TELEPHONE ENCOUNTER
Received referral to lung cancer screening program.  Chart review to assess for lung cancer screening program eligibility.   1. Age 50-80 yrs of age? Yes 56 y.o.  2. 20 pack year hx of smoking, or greater? Yes 1 [id number]= [id number] hx  3. Current smoker or if quit, has pt quit within last 15 yrs?Yes  Current smoker  4. Any signs or symptoms of lung cancer? None noted  5. Previous history of lung cancer? None noted  6. Chest CT within past 12 mos.? None noted  Patient does meet eligibility criteria. [facility] scheduling notified to schedule the shared decision making visit.

## 2022-11-13 DIAGNOSIS — F17.210 CIGARETTE SMOKER: ICD-10-CM

## 2022-11-14 RX ORDER — NICOTINE 21 MG/24HR
1 PATCH, TRANSDERMAL 24 HOURS TRANSDERMAL EVERY 24 HOURS
Qty: 28 PATCH | Refills: 0 | Status: SHIPPED | OUTPATIENT
Start: 2022-11-14 | End: 2022-12-28

## 2022-11-14 NOTE — TELEPHONE ENCOUNTER
Received request via: Pharmacy    Was the patient seen in the last year in this department? Yes    Does the patient have an active prescription (recently filled or refills available) for medication(s) requested? No    Does the patient have assisted Plus and need 100 day supply (blood pressure, diabetes and cholesterol meds only)? Patient does not have SCP

## 2022-12-22 ENCOUNTER — HOSPITAL ENCOUNTER (OUTPATIENT)
Dept: HEMATOLOGY ONCOLOGY | Facility: MEDICAL CENTER | Age: 57
End: 2022-12-22
Attending: NURSE PRACTITIONER
Payer: COMMERCIAL

## 2022-12-22 VITALS
SYSTOLIC BLOOD PRESSURE: 102 MMHG | BODY MASS INDEX: 28.79 KG/M2 | HEART RATE: 72 BPM | OXYGEN SATURATION: 95 % | TEMPERATURE: 98.6 F | DIASTOLIC BLOOD PRESSURE: 68 MMHG | RESPIRATION RATE: 14 BRPM | WEIGHT: 190 LBS | HEIGHT: 68 IN

## 2022-12-22 DIAGNOSIS — F17.210 CIGARETTE SMOKER: ICD-10-CM

## 2022-12-22 PROCEDURE — G0296 VISIT TO DETERM LDCT ELIG: HCPCS | Performed by: NURSE PRACTITIONER

## 2022-12-22 ASSESSMENT — ENCOUNTER SYMPTOMS
HEMOPTYSIS: 0
WHEEZING: 0
COUGH: 1
SHORTNESS OF BREATH: 0
WEIGHT LOSS: 0
SPUTUM PRODUCTION: 1

## 2022-12-22 ASSESSMENT — FIBROSIS 4 INDEX: FIB4 SCORE: 1.01

## 2022-12-22 NOTE — PROGRESS NOTES
"Subjective     Greg Manrique is a 56 y.o. male who presents with Lung Cancer Screening Program Prescreen ([facility]/[employer]/ Cigarette smoker/José Miguel Hebert)            HPI  Patient seen today for initial lung cancer screening visit. Patient referred by PCP, Dr. José Miguel Hebert, for lung cancer screening shared decision making visit.    The patient meets eligibility criteria including age, smoking history (30+ pack years), if former smoker, quit in the last 15 years, and absence of signs or symptoms of lung cancer.    - Age - 56  - Smoking history - Patient has smoked for 35 years at an average of 1 ppd = 35 pack year smoking history.  - Current smoking status - current smoker, using Nicoderm x 1 mo (now ~ 0.5 ppd)  - No symptoms of lung cancer and no previous history of lung cancer       Review of Systems   Constitutional:  Negative for malaise/fatigue and weight loss.   Respiratory:  Positive for cough and sputum production (yellow, white, clear). Negative for hemoptysis, shortness of breath and wheezing.        No Known Allergies      Current Outpatient Medications on File Prior to Encounter   Medication Sig Dispense Refill    nicotine (NICODERM) 14 MG/24HR PATCH 24 HR PLACE 1 PATCH ON THE SKIN EVERY 24 HOURS. 28 Patch 0    nicotine (NICODERM) 7 MG/24HR PATCH 24 HR Place 1 Patch on the skin every 24 hours for 28 days. (Patient not taking: Reported on 12/22/2022) 28 Patch 0     No current facility-administered medications on file prior to encounter.            Objective     /68 (BP Location: Left arm, Patient Position: Sitting, BP Cuff Size: Adult)   Pulse 72   Temp 37 °C (98.6 °F) (Temporal)   Resp 14   Ht 1.727 m (5' 8\")   Wt 86.2 kg (190 lb)   SpO2 95%   BMI 28.89 kg/m²      Physical Exam  Vitals reviewed.   Constitutional:       General: He is not in acute distress.     Appearance: He is well-developed. He is not diaphoretic.   Cardiovascular:      Rate and Rhythm: Normal rate and regular rhythm.    "   Heart sounds: Normal heart sounds. No murmur heard.    No friction rub. No gallop.   Pulmonary:      Effort: Pulmonary effort is normal. No respiratory distress.      Breath sounds: Normal breath sounds. No wheezing.   Musculoskeletal:         General: Normal range of motion.   Skin:     General: Skin is warm and dry.   Neurological:      Mental Status: He is alert and oriented to person, place, and time.              Assessment & Plan     1. Cigarette smoker  CT-LUNG CANCER-SCREENING          We conducted a shared decision-making process using a decision aid. We reviewed benefits and harms of screening, including false positives and potential need for additional diagnostic testing, the possibility of over diagnosis, and total radiation exposure.    We discussed the importance of adhering to annual LDCT screening. We also discussed the impact of comorbities on the patient's the ability or willingness to undergo diagnostic procedure(s) and treatment.    Counseling on the importance of maintaining cigarette smoking abstinence if former smoker; or the importance of smoking cessation if current smoker and, if appropriate, furnishing of information about tobacco cessation interventions. I provided patient with smoking [facility] materials and [facility] and the community. Patient appreciative of the resources.    Based on our discussion, we have decided to begin annual lung cancer screening starting now.

## 2022-12-28 DIAGNOSIS — F17.210 CIGARETTE SMOKER: ICD-10-CM

## 2022-12-28 RX ORDER — NICOTINE 21 MG/24HR
1 PATCH, TRANSDERMAL 24 HOURS TRANSDERMAL EVERY 24 HOURS
Qty: 28 PATCH | Refills: 0 | Status: SHIPPED | OUTPATIENT
Start: 2022-12-28 | End: 2023-02-06

## 2022-12-28 NOTE — TELEPHONE ENCOUNTER
Received request via: Pharmacy    Was the patient seen in the last year in this department? Yes    Does the patient have an active prescription (recently filled or refills available) for medication(s) requested? No    Does the patient have [facility] Plus and need 100 day supply (blood pressure, diabetes and cholesterol meds only)? Patient does not have SCP

## 2023-01-11 ENCOUNTER — HOSPITAL ENCOUNTER (OUTPATIENT)
Dept: RADIOLOGY | Facility: MEDICAL CENTER | Age: 58
End: 2023-01-11
Attending: NURSE PRACTITIONER
Payer: COMMERCIAL

## 2023-01-11 DIAGNOSIS — F17.210 CIGARETTE SMOKER: ICD-10-CM

## 2023-01-11 PROCEDURE — 71271 CT THORAX LUNG CANCER SCR C-: CPT

## 2023-01-12 DIAGNOSIS — Z12.5 PROSTATE CANCER SCREENING: ICD-10-CM

## 2023-01-12 DIAGNOSIS — E78.5 DYSLIPIDEMIA: ICD-10-CM

## 2023-01-12 DIAGNOSIS — Z78.9 ALCOHOL USE: ICD-10-CM

## 2023-01-12 DIAGNOSIS — R71.8 RBC MICROCYTOSIS: ICD-10-CM

## 2023-01-13 ENCOUNTER — TELEPHONE (OUTPATIENT)
Dept: HEMATOLOGY ONCOLOGY | Facility: MEDICAL CENTER | Age: 58
End: 2023-01-13
Payer: COMMERCIAL

## 2023-01-13 NOTE — TELEPHONE ENCOUNTER
Called patient to discuss LDCT results and follow up recommendations for the lung cancer screening program.  Requested call back to RN at [phone].

## 2023-01-16 ENCOUNTER — TELEPHONE (OUTPATIENT)
Dept: HEMATOLOGY ONCOLOGY | Facility: MEDICAL CENTER | Age: 58
End: 2023-01-16
Payer: COMMERCIAL

## 2023-01-16 NOTE — TELEPHONE ENCOUNTER
Pt returned call to RN to review results of LDCT exam performed 1/11/2023.    Notified him that the results showed linear atelectasis or scarring with possible 9 mm nodularity abutting the fissure.     We recommend a follow-up low-dose chest CT in 6 months to monitor for changes and will referral to [facility] Nod[facility] for evaluation and continued monitoring.    Patient updated regarding incidental findings of mild emphysema and trace atherosclerosis including coronary artery disease and will follow-up with PCP/referring provider.    Patient agrees to all recommendations.    Referring provider Dr. José Miguel Hebert notified of results and incidental findings per this communication.    Health maintenance to be updated and patient will be sent lung cancer screening result letter.        CT-LUNG CANCER-SCREENING    Result Date: 1/11/2023 1/11/2023 3:50 PM HISTORY/REASON FOR EXAM:  lung cancer screening; lung cancer screening. TECHNIQUE/EXAM DESCRIPTION AND NUMBER OF VIEWS: Lung cancer screening without contrast. Low dose noncontrast helical images were obtained of the chest from the lung apices through the costophrenic sulci utilizing thin collimation and intervals with reconstructed images sent to PACS in axial, coronal and sagittal planes. Low dose optimization technique was utilized for this CT exam including automated exposure control and adjustment of the mA and/or kV according to patient size. COMPARISON: None. FINDINGS: There is lingular atelectasis or scarring with a possible 9 mm nodularity abutting the fissure. There are mild emphysematous changes of the lungs. There is no pleural effusion or pneumothorax. There is no mediastinal, hilar or axillary adenopathy. There are trace atherosclerotic calcifications of the aorta and its branch vessels including the coronary arteries. Heart size is normal. There is no pericardial effusion. There are no suspicious osseous lesions.     1.  Linear  atelectasis or scarring with possible 9 mm nodularity abutting the fissure. 2.  Mild emphysema. 3.  Trace atherosclerosis including coronary artery disease. Lung RADS: 3 - Probably Benign: Probably benign finding(s) - short term followup suggested; includes nodules with a low likelihood of becoming a clinically active cancer Findings: solid nodule(s): greater than or equal to 6 to less than 8 mm at baseline OR new 4 mm to less than 6 mm part solid nodule(s): greater than or equal to 6 mm total diameter with solid component less than 6 mm OR new less than 6 mm total diameter non solid nodule(s) (GGN) greater than or equal to 30 mm on baseline CT or new Management: 6 month LDCT

## 2023-01-19 DIAGNOSIS — R91.8 ABNORMAL CT LUNG SCREENING: ICD-10-CM

## 2023-01-19 NOTE — Clinical Note
Dr. Hebert, Thank you for referring your patient to the lung cancer screening program. Based on the results from the LDCP, it is recommended patient be referred for closer monitoring per Pulmonary Nodule Clinic - this referral is sent accordingly.

## 2023-01-19 NOTE — PROGRESS NOTES
Patient was referred to lung cancer screening program ([facility]) per PCP, Dr. José Miguel Hebert and evaluated on 12/22/22.  LDCT was completed on 1/11/23 which showed abnormal findings of:  linear atelectasis or scarring with possible 9 mm nodularity abutting the fissure.         Patient is updated per office RN and referred to Pulmonary Nodule Clinic accordingly, for close monitoring.  Referring provider has been updated per this communication      Referral also placed to High Risk Nurse Navigator      CT-LUNG CANCER-SCREENING    Result Date: 1/11/2023 1/11/2023 3:50 PM HISTORY/REASON FOR EXAM:  lung cancer screening; lung cancer screening. TECHNIQUE/EXAM DESCRIPTION AND NUMBER OF VIEWS: Lung cancer screening without contrast. Low dose noncontrast helical images were obtained of the chest from the lung apices through the costophrenic sulci utilizing thin collimation and intervals with reconstructed images sent to PACS in axial, coronal and sagittal planes. Low dose optimization technique was utilized for this CT exam including automated exposure control and adjustment of the mA and/or kV according to patient size. COMPARISON: None. FINDINGS: There is lingular atelectasis or scarring with a possible 9 mm nodularity abutting the fissure. There are mild emphysematous changes of the lungs. There is no pleural effusion or pneumothorax. There is no mediastinal, hilar or axillary adenopathy. There are trace atherosclerotic calcifications of the aorta and its branch vessels including the coronary arteries. Heart size is normal. There is no pericardial effusion. There are no suspicious osseous lesions.     1.  Linear atelectasis or scarring with possible 9 mm nodularity abutting the fissure. 2.  Mild emphysema. 3.  Trace atherosclerosis including coronary artery disease. Lung RADS: 3 - Probably Benign: Probably benign finding(s) - short term followup suggested; includes nodules with a low likelihood of becoming a clinically  active cancer Findings: solid nodule(s): greater than or equal to 6 to less than 8 mm at baseline OR new 4 mm to less than 6 mm part solid nodule(s): greater than or equal to 6 mm total diameter with solid component less than 6 mm OR new less than 6 mm total diameter non solid nodule(s) (GGN) greater than or equal to 30 mm on baseline CT or new Management: 6 month LDCT

## 2023-01-27 ENCOUNTER — TELEPHONE (OUTPATIENT)
Dept: HEALTH INFORMATION MANAGEMENT | Facility: OTHER | Age: 58
End: 2023-01-27
Payer: COMMERCIAL

## 2023-01-30 ENCOUNTER — PATIENT OUTREACH (OUTPATIENT)
Dept: ONCOLOGY | Facility: MEDICAL CENTER | Age: 58
End: 2023-01-30
Payer: COMMERCIAL

## 2023-01-30 NOTE — PROGRESS NOTES
[facility]  High Risk Lung  Lung RADS 3  6 month low dose chest CT recommended  No follow up appointments on file at this time

## 2023-02-04 DIAGNOSIS — F17.210 CIGARETTE SMOKER: ICD-10-CM

## 2023-02-06 RX ORDER — NICOTINE 21 MG/24HR
1 PATCH, TRANSDERMAL 24 HOURS TRANSDERMAL EVERY 24 HOURS
Qty: 28 PATCH | Refills: 0 | Status: SHIPPED | OUTPATIENT
Start: 2023-02-06

## 2023-03-13 ENCOUNTER — HOSPITAL ENCOUNTER (OUTPATIENT)
Dept: LAB | Facility: MEDICAL CENTER | Age: 58
End: 2023-03-13
Attending: FAMILY MEDICINE
Payer: COMMERCIAL

## 2023-03-13 DIAGNOSIS — Z78.9 ALCOHOL USE: ICD-10-CM

## 2023-03-13 DIAGNOSIS — E78.5 DYSLIPIDEMIA: ICD-10-CM

## 2023-03-13 DIAGNOSIS — R71.8 RBC MICROCYTOSIS: ICD-10-CM

## 2023-03-13 DIAGNOSIS — Z12.5 PROSTATE CANCER SCREENING: ICD-10-CM

## 2023-03-13 LAB
ALBUMIN SERPL BCP-MCNC: 4.5 G/DL (ref 3.2–4.9)
ALBUMIN/GLOB SERPL: 2 G/DL
ALP SERPL-CCNC: 64 U/L (ref 30–99)
ALT SERPL-CCNC: 15 U/L (ref 2–50)
ANION GAP SERPL CALC-SCNC: 12 MMOL/L (ref 7–16)
AST SERPL-CCNC: 13 U/L (ref 12–45)
BASOPHILS # BLD AUTO: 0.8 % (ref 0–1.8)
BASOPHILS # BLD: 0.05 K/UL (ref 0–0.12)
BILIRUB SERPL-MCNC: 0.8 MG/DL (ref 0.1–1.5)
BUN SERPL-MCNC: 14 MG/DL (ref 8–22)
CALCIUM ALBUM COR SERPL-MCNC: 8.7 MG/DL (ref 8.5–10.5)
CALCIUM SERPL-MCNC: 9.1 MG/DL (ref 8.5–10.5)
CHLORIDE SERPL-SCNC: 106 MMOL/L (ref 96–112)
CHOLEST SERPL-MCNC: 229 MG/DL (ref 100–199)
CO2 SERPL-SCNC: 20 MMOL/L (ref 20–33)
CREAT SERPL-MCNC: 0.92 MG/DL (ref 0.5–1.4)
EOSINOPHIL # BLD AUTO: 0.16 K/UL (ref 0–0.51)
EOSINOPHIL NFR BLD: 2.5 % (ref 0–6.9)
ERYTHROCYTE [DISTWIDTH] IN BLOOD BY AUTOMATED COUNT: 42 FL (ref 35.9–50)
FASTING STATUS PATIENT QL REPORTED: NORMAL
FOLATE SERPL-MCNC: 17 NG/ML
GFR SERPLBLD CREATININE-BSD FMLA CKD-EPI: 97 ML/MIN/1.73 M 2
GLOBULIN SER CALC-MCNC: 2.3 G/DL (ref 1.9–3.5)
GLUCOSE SERPL-MCNC: 100 MG/DL (ref 65–99)
HCT VFR BLD AUTO: 52.8 % (ref 42–52)
HDLC SERPL-MCNC: 54 MG/DL
HGB BLD-MCNC: 17.4 G/DL (ref 14–18)
IMM GRANULOCYTES # BLD AUTO: 0.05 K/UL (ref 0–0.11)
IMM GRANULOCYTES NFR BLD AUTO: 0.8 % (ref 0–0.9)
IRON SATN MFR SERPL: 43 % (ref 15–55)
IRON SERPL-MCNC: 130 UG/DL (ref 50–180)
LDLC SERPL CALC-MCNC: 129 MG/DL
LYMPHOCYTES # BLD AUTO: 1.92 K/UL (ref 1–4.8)
LYMPHOCYTES NFR BLD: 30.2 % (ref 22–41)
MCH RBC QN AUTO: 26.4 PG (ref 27–33)
MCHC RBC AUTO-ENTMCNC: 33 G/DL (ref 33.7–35.3)
MCV RBC AUTO: 80.1 FL (ref 81.4–97.8)
MONOCYTES # BLD AUTO: 0.44 K/UL (ref 0–0.85)
MONOCYTES NFR BLD AUTO: 6.9 % (ref 0–13.4)
NEUTROPHILS # BLD AUTO: 3.73 K/UL (ref 1.82–7.42)
NEUTROPHILS NFR BLD: 58.8 % (ref 44–72)
NRBC # BLD AUTO: 0 K/UL
NRBC BLD-RTO: 0 /100 WBC
PLATELET # BLD AUTO: 243 K/UL (ref 164–446)
PMV BLD AUTO: 9.7 FL (ref 9–12.9)
POTASSIUM SERPL-SCNC: 4.1 MMOL/L (ref 3.6–5.5)
PROT SERPL-MCNC: 6.8 G/DL (ref 6–8.2)
PSA SERPL-MCNC: 0.38 NG/ML (ref 0–4)
RBC # BLD AUTO: 6.59 M/UL (ref 4.7–6.1)
SODIUM SERPL-SCNC: 138 MMOL/L (ref 135–145)
TIBC SERPL-MCNC: 300 UG/DL (ref 250–450)
TRIGL SERPL-MCNC: 232 MG/DL (ref 0–149)
UIBC SERPL-MCNC: 170 UG/DL (ref 110–370)
VIT B12 SERPL-MCNC: 428 PG/ML (ref 211–911)
WBC # BLD AUTO: 6.4 K/UL (ref 4.8–10.8)

## 2023-03-13 PROCEDURE — 85025 COMPLETE CBC W/AUTO DIFF WBC: CPT

## 2023-03-13 PROCEDURE — 80061 LIPID PANEL: CPT

## 2023-03-13 PROCEDURE — 80053 COMPREHEN METABOLIC PANEL: CPT

## 2023-03-13 PROCEDURE — 84153 ASSAY OF PSA TOTAL: CPT

## 2023-03-13 PROCEDURE — 83550 IRON BINDING TEST: CPT

## 2023-03-13 PROCEDURE — 36415 COLL VENOUS BLD VENIPUNCTURE: CPT

## 2023-03-13 PROCEDURE — 82746 ASSAY OF FOLIC ACID SERUM: CPT

## 2023-03-13 PROCEDURE — 83540 ASSAY OF IRON: CPT

## 2023-03-13 PROCEDURE — 82607 VITAMIN B-12: CPT

## 2023-03-15 ENCOUNTER — TELEPHONE (OUTPATIENT)
Dept: MEDICAL GROUP | Facility: MEDICAL CENTER | Age: 58
End: 2023-03-15
Payer: COMMERCIAL

## 2023-04-27 ENCOUNTER — TELEPHONE (OUTPATIENT)
Dept: SLEEP MEDICINE | Facility: MEDICAL CENTER | Age: 58
End: 2023-04-27
Payer: COMMERCIAL

## 2023-04-27 NOTE — TELEPHONE ENCOUNTER
Record reviewed because patient had Lung Rads 3 CT scan 1/11/23.  He is due for follow-up CT scan on 7/23.  No CT has been ordered, yet, and he does not have a PCP follow-up appointment.  I sent a message to his PCP today offering to follow-up with patient to order his follow-up imaging, if PCP desires. -Dr. Eva Flores (Lung Nodule Provider)

## 2023-07-17 ENCOUNTER — TELEPHONE (OUTPATIENT)
Dept: SLEEP MEDICINE | Facility: MEDICAL CENTER | Age: 58
End: 2023-07-17
Payer: COMMERCIAL

## 2023-07-17 NOTE — TELEPHONE ENCOUNTER
Patient had Lung Rads 3 CT-lung cancer screening on 1/11/23 and is due for follow-up imaging.  His PCP has placed a referral for the lung nodule clinic and the pulmonary scheduling team has at reached with 2 calls, however patient has not been reached and no pulmonary appointment is currently on the schedule.  Message sent to the pulmonary scheduling team to reach out again to help patient schedule a lung nodule clinic appointment discussed further imaging.  -Dr. Eva Flores

## 2023-08-16 ENCOUNTER — PATIENT OUTREACH (OUTPATIENT)
Dept: ONCOLOGY | Facility: MEDICAL CENTER | Age: 58
End: 2023-08-16
Payer: COMMERCIAL

## 2023-08-18 NOTE — PROGRESS NOTES
Received inbasket message that patient now scheduled for pulmonary nodule clinic with Dr MELINDA Flores and she will order follow up imaging at that time.

## 2023-08-21 NOTE — PROGRESS NOTES
"Subjective:     CC:  Diagnoses of Abnormal CT lung screening and Atherosclerosis of aorta (HCC) were pertinent to this visit.    HISTORY OF THE PRESENT ILLNESS: Patient is a 57 y.o. male. This pleasant patient is here today to establish care in the [facility] and discuss previous imaging. His PCP is Dr. José Miguel Hebert and his referring provider is Sharona Rubio.    Pulmonary nodules- Greg is a 57M smoker with a 35 pack year history who had his first CT-lung cancer screening on 1/11/23 which was significant for \"linear atelectasis or scarring with possible 9mm nodularity abutting the fissure.\"  He has no previous Cts of his chest for comparison.    He has had significant second hand tobacco smoke exposure.  He smokes marijuana occasionally.  He lived in [facility], [facility], and now lives in [place].  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  He has no known exposure to dust, fumes, solvents, gases, tuberculosis, radon, chemicals, radiation, or asbestos.  He has never been hospitalized for respiratory problems or been on a ventilator.  He has no hx of pneumonia, asthma, emphysema or COPD.  He has no fevers, chills, abnormal weight loss, SOB, chest pain, palpitations, or wheezing.  He has a chronic cough for the last few years and has sputum, but this only occurs in the AM.  Symptoms only occur if he wakes up early but improve with resting longer.  No hemoptysis.      Of note, his mother and father both had lung cancer.    Allergies: Patient has no known allergies.    Current Outpatient Medications Ordered in Epic   Medication Sig Dispense Refill    nicotine (NICODERM) 14 MG/24HR PATCH 24 HR PLACE 1 PATCH ON THE SKIN EVERY 24 HOURS. (Patient not taking: Reported on 8/23/2023) 28 Patch 0     No current Epic-ordered facility-administered medications on file.       Past Medical History:   Diagnosis Date    Dental abscess        Past " "Surgical History:   Procedure Laterality Date    OTHER      jaw surgery       Social History     Tobacco Use    Smoking status: Every Day     Current packs/day: 1.00     Average packs/day: 1 pack/day for 35.0 years (35.0 ttl pk-yrs)     Types: Cigarettes    Smokeless tobacco: Never    Tobacco comments:     Since age 21   Vaping Use    Vaping Use: Never used   Substance Use Topics    Alcohol use: Yes     Alcohol/week: 4.2 oz     Types: 7 Standard drinks or equivalent per week    Drug use: Yes     Frequency: 3.0 times per week     Types: Inhaled, Marijuana     Comment: occasionally, ryne       Social History     Social History Narrative    Not on file       Family History   Problem Relation Age of Onset    Cancer Mother         lung    Cancer Father         lung    Hyperlipidemia Brother     Heart Disease Paternal Aunt     Diabetes Neg Hx     Hypertension Neg Hx     Stroke Neg Hx     Lung Disease Neg Hx        Health Maintenance: declined pneumonia vaccine    ROS:   Gen: no fevers/chills, no changes in weight  ENT: no bloody nose  Pulm: per hpi  CV: no chest pain, no palpitations  GI: no nausea/vomiting, no diarrhea  : no dysuria  Heme/Lymph: no abnormal bleeding      Objective:     Exam: /68 (BP Location: Right arm, Patient Position: Sitting, BP Cuff Size: Adult)   Pulse 60   Resp 16   Ht 1.727 m (5' 8\")   Wt 84.8 kg (187 lb)   SpO2 96%  Body mass index is 28.43 kg/m².    General: Normal appearing. No distress.  HEENT: Normocephalic.    Eyes: Eyes conjunctiva clear lids without ptosis  Neck: Supple. Thyroid is not enlarged.  Pulmonary: Clear to ausculation.  Normal effort. No rales, ronchi, or wheezing.  Cardiovascular: Regular rate and rhythm without murmur.   Abdomen: Soft, nontender, nondistended. Normal bowel sounds.   Neurologic: No gross motor deficits  Lymph: No cervical or supraclavicular lymph nodes are palpable  Skin: Warm and dry.  No obvious lesions.  Musculoskeletal: No extremity " cyanosis, clubbing, or edema.  Psych: Normal mood and affect. Alert and oriented. Judgment and insight is normal.      Assessment & Plan:   57 y.o. male with the following -    1. Abnormal CT lung screening  New issue with lingular atelectasis with possible 9mm nodularity abutting the fissure and emphysematous changed noted on CT lung cancer- screening 1/11/23.  He has no previous Cts for comparison.  Pt to follow-up with his PCP regarding the emphysematous changes.  Recommended repeat CT within the next couple weeks to evaluate to interval change of the possible 9mm nodularity.  Encouraged smoking cessation.  Follow-up and red flag precautions given.  Patient to return to clinic for imaging review. Patient expressed understanding and agreement with plan.  - CT-CHEST (THORAX) W/O; Future    2. Atherosclerosis of aorta (HCC)  Trace, asymptomatic.  Noted on CT-lung cancer screening 1/11/23.  Recommended follow-up with his PCP for cholesterol monitoring and treatment.  Encouraged low cholesterol diet. Patient expressed understanding and agreement with plan.        Return in about 4 weeks (around 9/20/2023) for abnormal CT follow-up with appointment after CT has been obtained.    Please note that this dictation was created using voice recognition software. I have made every reasonable attempt to correct obvious errors, but I expect that there are errors of grammar and possibly content that I did not discover before finalizing the note.

## 2023-08-23 ENCOUNTER — OFFICE VISIT (OUTPATIENT)
Dept: SLEEP MEDICINE | Facility: MEDICAL CENTER | Age: 58
End: 2023-08-23
Attending: NURSE PRACTITIONER
Payer: COMMERCIAL

## 2023-08-23 VITALS
DIASTOLIC BLOOD PRESSURE: 68 MMHG | RESPIRATION RATE: 16 BRPM | BODY MASS INDEX: 28.34 KG/M2 | HEART RATE: 60 BPM | WEIGHT: 187 LBS | SYSTOLIC BLOOD PRESSURE: 106 MMHG | HEIGHT: 68 IN | OXYGEN SATURATION: 96 %

## 2023-08-23 DIAGNOSIS — I70.0 ATHEROSCLEROSIS OF AORTA (HCC): ICD-10-CM

## 2023-08-23 DIAGNOSIS — R91.8 ABNORMAL CT LUNG SCREENING: ICD-10-CM

## 2023-08-23 PROCEDURE — 3078F DIAST BP <80 MM HG: CPT | Performed by: FAMILY MEDICINE

## 2023-08-23 PROCEDURE — 99214 OFFICE O/P EST MOD 30 MIN: CPT | Performed by: FAMILY MEDICINE

## 2023-08-23 PROCEDURE — 99211 OFF/OP EST MAY X REQ PHY/QHP: CPT | Performed by: FAMILY MEDICINE

## 2023-08-23 PROCEDURE — 3074F SYST BP LT 130 MM HG: CPT | Performed by: FAMILY MEDICINE

## 2023-08-23 ASSESSMENT — FIBROSIS 4 INDEX: FIB4 SCORE: 0.79

## 2023-08-23 NOTE — Clinical Note
Thank you for referring Greg to the [facility].  Please see my attached note and let me know if you have questions or concerns regarding his lung nodule treatment plan. -Dr. Eva Flores

## 2023-09-15 ENCOUNTER — HOSPITAL ENCOUNTER (OUTPATIENT)
Dept: RADIOLOGY | Facility: MEDICAL CENTER | Age: 58
End: 2023-09-15
Attending: FAMILY MEDICINE
Payer: COMMERCIAL

## 2023-09-15 DIAGNOSIS — R91.8 ABNORMAL CT LUNG SCREENING: ICD-10-CM

## 2023-09-15 PROCEDURE — 71250 CT THORAX DX C-: CPT

## 2024-02-21 ENCOUNTER — PATIENT OUTREACH (OUTPATIENT)
Dept: ONCOLOGY | Facility: MEDICAL CENTER | Age: 59
End: 2024-02-21
Payer: COMMERCIAL

## 2024-02-21 NOTE — PROGRESS NOTES
Chart review    CT 9/15/2023    Lung RADS 2     Management: Continue annual screening with LDCT in 12 months     High risk navigation will sign off

## 2024-09-16 ENCOUNTER — TELEPHONE (OUTPATIENT)
Dept: SLEEP MEDICINE | Facility: MEDICAL CENTER | Age: 59
End: 2024-09-16
Payer: COMMERCIAL